# Patient Record
(demographics unavailable — no encounter records)

---

## 2025-07-16 NOTE — PHYSICAL EXAM
[No Supraclavicular Adenopathy] : no supraclavicular adenopathy [No Cervical Adenopathy] : no cervical adenopathy [Clear to Auscultation Bilat] : clear to auscultation bilaterally [No Axillary Lymphadenopathy] : no left axillary lymphadenopathy [de-identified] : In the left lower back is a 20 mm irregular heterogeneously discolored nevus with a healing eschar in the center.  There are no satellite lesions. [de-identified] : No inguinal adenopathy.

## 2025-07-16 NOTE — HISTORY OF PRESENT ILLNESS
[FreeTextEntry1] : 79-year-old gentleman who has had a nevus in the left lower back for many years and noticed that for the last year it had increased in size and changed its coloration.  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  This is the first skin cancer the patient has ever had and there is no family history of melanoma.  Patient otherwise has no other symptomatology.